# Patient Record
Sex: MALE | URBAN - METROPOLITAN AREA
[De-identification: names, ages, dates, MRNs, and addresses within clinical notes are randomized per-mention and may not be internally consistent; named-entity substitution may affect disease eponyms.]

---

## 2022-11-09 ENCOUNTER — CLAIMS CREATED FROM THE CLAIM WINDOW (OUTPATIENT)
Dept: URBAN - METROPOLITAN AREA MEDICAL CENTER 40 | Facility: MEDICAL CENTER | Age: 82
End: 2022-11-09
Payer: MEDICARE

## 2022-11-09 DIAGNOSIS — K92.1 MELENA: ICD-10-CM

## 2022-11-09 DIAGNOSIS — K57.30 DIVERTICULOSIS OF LARGE INTESTINE WITHOUT PERFORATION OR ABSCESS WITHOUT BLEEDING: ICD-10-CM

## 2022-11-09 DIAGNOSIS — D64.89 OTHER SPECIFIED ANEMIAS: ICD-10-CM

## 2022-11-09 PROCEDURE — 99222 1ST HOSP IP/OBS MODERATE 55: CPT | Performed by: INTERNAL MEDICINE

## 2022-11-10 ENCOUNTER — CLAIMS CREATED FROM THE CLAIM WINDOW (OUTPATIENT)
Dept: URBAN - METROPOLITAN AREA MEDICAL CENTER 40 | Facility: MEDICAL CENTER | Age: 82
End: 2022-11-10
Payer: MEDICARE

## 2022-11-10 DIAGNOSIS — D64.89 ANEMIA DUE TO OTHER CAUSE: ICD-10-CM

## 2022-11-10 DIAGNOSIS — K92.1 MELENA: ICD-10-CM

## 2022-11-10 PROCEDURE — 43235 EGD DIAGNOSTIC BRUSH WASH: CPT | Performed by: INTERNAL MEDICINE

## 2022-11-11 ENCOUNTER — TELEPHONE ENCOUNTER (OUTPATIENT)
Dept: URBAN - METROPOLITAN AREA CLINIC 72 | Facility: CLINIC | Age: 82
End: 2022-11-11

## 2022-11-11 RX ORDER — ALBUTEROL SULFATE 2.5 MG/3ML
INHALE ONE VIAL VIA NEBULIZER THREE TIMES A DAY SOLUTION RESPIRATORY (INHALATION)
Qty: 270 UNSPECIFIED | Refills: 1 | Status: ACTIVE | COMMUNITY

## 2022-11-11 RX ORDER — PREGABALIN 100 MG/1
1 CAPSULE CAPSULE ORAL ONCE A DAY
Status: ACTIVE | COMMUNITY

## 2022-11-11 RX ORDER — FINASTERIDE 5 MG/1
1 TABLET TABLET, FILM COATED ORAL ONCE A DAY
Status: ACTIVE | COMMUNITY

## 2022-11-11 RX ORDER — ROSUVASTATIN CALCIUM 10 MG/1
1 TABLET TABLET, FILM COATED ORAL ONCE A DAY
Status: ACTIVE | COMMUNITY

## 2022-11-11 RX ORDER — MONTELUKAST SODIUM 10 MG/1
TAKE 1 TABLET BY MOUTH ONCE DAILY AS DIRECTED TABLET, FILM COATED ORAL
Qty: 30 EACH | Refills: 6 | Status: ACTIVE | COMMUNITY

## 2022-11-11 RX ORDER — TAMSULOSIN HYDROCHLORIDE 0.4 MG/1
1 CAPSULE CAPSULE ORAL ONCE A DAY
Status: ACTIVE | COMMUNITY

## 2022-11-11 RX ORDER — OMEPRAZOLE 20 MG/1
1 CAPSULE 30 MINUTES BEFORE MORNING MEAL CAPSULE, DELAYED RELEASE ORAL ONCE A DAY
Status: ACTIVE | COMMUNITY

## 2022-11-11 RX ORDER — ZOLPIDEM TARTRATE 5 MG/1
TABLET, FILM COATED ORAL
Qty: 30 TABLET | Status: ACTIVE | COMMUNITY

## 2022-11-11 RX ORDER — TRAMADOL HYDROCHLORIDE 50 MG/1
TAKE 1 TO 2 TABLETS BY MOUTH EVERY 8 HOURS AS NEEDED FOR PAIN TABLET ORAL
Qty: 30 EACH | Refills: 0 | Status: ACTIVE | COMMUNITY

## 2022-11-11 RX ORDER — MELOXICAM 15 MG/1
1 TABLET TABLET ORAL ONCE A DAY
Status: ACTIVE | COMMUNITY

## 2022-11-11 RX ORDER — DOCUSATE SODIUM 100 MG/1
1 CAPSULE AS NEEDED CAPSULE ORAL ONCE A DAY
Status: ACTIVE | COMMUNITY

## 2022-11-11 RX ORDER — ASPIRIN 81 MG/1
1 TABLET TABLET, CHEWABLE ORAL ONCE A DAY
Status: ACTIVE | COMMUNITY

## 2022-11-14 ENCOUNTER — OFFICE VISIT (OUTPATIENT)
Dept: URBAN - METROPOLITAN AREA MEDICAL CENTER 40 | Facility: MEDICAL CENTER | Age: 82
End: 2022-11-14
Payer: MEDICARE

## 2022-11-14 DIAGNOSIS — K63.5 BENIGN COLON POLYP: ICD-10-CM

## 2022-11-14 DIAGNOSIS — K62.5 ANAL BLEEDING: ICD-10-CM

## 2022-11-14 DIAGNOSIS — K57.30 ACQUIRED DIVERTICULOSIS OF COLON: ICD-10-CM

## 2022-11-14 PROCEDURE — 45378 DIAGNOSTIC COLONOSCOPY: CPT | Performed by: INTERNAL MEDICINE

## 2022-11-17 ENCOUNTER — TELEPHONE ENCOUNTER (OUTPATIENT)
Dept: URBAN - METROPOLITAN AREA CLINIC 72 | Facility: CLINIC | Age: 82
End: 2022-11-17

## 2022-11-29 ENCOUNTER — CLAIMS CREATED FROM THE CLAIM WINDOW (OUTPATIENT)
Dept: URBAN - METROPOLITAN AREA CLINIC 72 | Facility: CLINIC | Age: 82
End: 2022-11-29
Payer: MEDICARE

## 2022-11-29 VITALS
HEART RATE: 62 BPM | BODY MASS INDEX: 28.93 KG/M2 | HEIGHT: 66 IN | SYSTOLIC BLOOD PRESSURE: 119 MMHG | WEIGHT: 180 LBS | DIASTOLIC BLOOD PRESSURE: 54 MMHG | TEMPERATURE: 98 F

## 2022-11-29 DIAGNOSIS — K57.30 ACQUIRED DIVERTICULOSIS OF COLON: ICD-10-CM

## 2022-11-29 DIAGNOSIS — D50.9 IRON DEFICIENCY ANEMIA, UNSPECIFIED IRON DEFICIENCY ANEMIA TYPE: ICD-10-CM

## 2022-11-29 DIAGNOSIS — K59.00 CONSTIPATION, UNSPECIFIED CONSTIPATION TYPE: ICD-10-CM

## 2022-11-29 PROBLEM — 398050005 DIVERTICULAR DISEASE OF COLON: Status: ACTIVE | Noted: 2022-11-21

## 2022-11-29 PROBLEM — 14760008: Status: ACTIVE | Noted: 2022-11-29

## 2022-11-29 PROCEDURE — 99214 OFFICE O/P EST MOD 30 MIN: CPT | Performed by: NURSE PRACTITIONER

## 2022-11-29 RX ORDER — ASPIRIN 81 MG/1
1 TABLET TABLET, CHEWABLE ORAL ONCE A DAY
Status: ON HOLD | COMMUNITY

## 2022-11-29 RX ORDER — MELOXICAM 15 MG/1
1 TABLET TABLET ORAL ONCE A DAY
Status: ACTIVE | COMMUNITY

## 2022-11-29 RX ORDER — OMEPRAZOLE 20 MG/1
1 CAPSULE 30 MINUTES BEFORE MORNING MEAL CAPSULE, DELAYED RELEASE ORAL ONCE A DAY
Status: ACTIVE | COMMUNITY

## 2022-11-29 RX ORDER — ZOLPIDEM TARTRATE 5 MG/1
TABLET, FILM COATED ORAL
Qty: 30 TABLET | Status: ACTIVE | COMMUNITY

## 2022-11-29 RX ORDER — ALBUTEROL SULFATE 2.5 MG/3ML
INHALE ONE VIAL VIA NEBULIZER THREE TIMES A DAY SOLUTION RESPIRATORY (INHALATION)
Qty: 270 UNSPECIFIED | Refills: 1 | Status: ACTIVE | COMMUNITY

## 2022-11-29 RX ORDER — FINASTERIDE 5 MG/1
1 TABLET TABLET, FILM COATED ORAL ONCE A DAY
Status: ACTIVE | COMMUNITY

## 2022-11-29 RX ORDER — PREGABALIN 100 MG/1
1 CAPSULE CAPSULE ORAL ONCE A DAY
Status: ACTIVE | COMMUNITY

## 2022-11-29 RX ORDER — TRAMADOL HYDROCHLORIDE 50 MG/1
TAKE 1 TO 2 TABLETS BY MOUTH EVERY 8 HOURS AS NEEDED FOR PAIN TABLET ORAL
Qty: 30 EACH | Refills: 0 | Status: ACTIVE | COMMUNITY

## 2022-11-29 RX ORDER — TAMSULOSIN HYDROCHLORIDE 0.4 MG/1
1 CAPSULE CAPSULE ORAL ONCE A DAY
Status: ACTIVE | COMMUNITY

## 2022-11-29 RX ORDER — DOCUSATE SODIUM 100 MG/1
1 CAPSULE AS NEEDED CAPSULE ORAL ONCE A DAY
Status: ACTIVE | COMMUNITY

## 2022-11-29 RX ORDER — MONTELUKAST SODIUM 10 MG/1
TAKE 1 TABLET BY MOUTH ONCE DAILY AS DIRECTED TABLET, FILM COATED ORAL
Qty: 30 EACH | Refills: 6 | Status: ACTIVE | COMMUNITY

## 2022-11-29 RX ORDER — ROSUVASTATIN CALCIUM 10 MG/1
1 TABLET TABLET, FILM COATED ORAL ONCE A DAY
Status: ACTIVE | COMMUNITY

## 2022-11-29 NOTE — HPI-TODAY'S VISIT:
82-year-old male here for colonoscopy follow-up.  Was seen inpatient 11/9/2022 for blood in stool.  There is mention of dark-colored stool as well.  Work-up in ER confirmed anemia and CT showed diverticulosis.  There is no further bleeding since arrival in the hospital.  EGD 11/10/2022.  Normal esophagus, stomach and duodenum. Colonoscopy 11/14/2022 revealed moderate diverticulosis throughout the colon.  No bleeding or signs of recent bleeding no evidence of heme product.  3 mm sessile transverse colon polyp not removed due to risk of bleeding and diagnostic nature of colonoscopy.  Consider repeat colonoscopy in the future for polypectomy.  CT abdomen and pelvis with contrast 11/9/2022.  Pancolonic diverticulosis severe in descending and sigmoid colon.  5 mm nodule and right middle lobe lung base and 5 mm nodule left lateral lower lobe lung.  Labs 11/9/2022.  CBC:Hemoglobin 9.6, Kenny crit 29.2, RBC 3.71, MCV 79, RDW 18.  INR 1.2.  Lipase normal at 36.  CMP: Sodium 135, calcium 8.5, BUN 27. Labs 11/10/2022.  Hemoglobin 8.1, iron low at 22, ferritin 13.3. Labs 11/11/2022.  CBC:Hemoglobin 8, hematocrit 25.2, RBC 3.15, MCV 88.  On interview today he is here with his wife and is in a wheelchair due to his COPD an is not able to walk far.  He is not on oxygen.  This is not new.  He reports he feels weak from his hospital visit and is cold.  No melena or hematochezia.  He is on omeprazole and has been on it for years which controls his GERD. He takes meloxicam for pain.  Occasional constipatoin, on 4 stool softeners daily and miralax.  Going to his PCP tomorrow.

## 2022-12-01 ENCOUNTER — TELEPHONE ENCOUNTER (OUTPATIENT)
Dept: URBAN - METROPOLITAN AREA CLINIC 72 | Facility: CLINIC | Age: 82
End: 2022-12-01

## 2022-12-01 PROBLEM — 87522002: Status: ACTIVE | Noted: 2022-11-29

## 2022-12-01 LAB
ABSOLUTE BASOPHILS: 30
ABSOLUTE EOSINOPHILS: 70
ABSOLUTE LYMPHOCYTES: 1395
ABSOLUTE MONOCYTES: 625
ABSOLUTE NEUTROPHILS: 2880
BASOPHILS: 0.6
EOSINOPHILS: 1.4
HEMATOCRIT: 23.4
HEMOGLOBIN: 7.2
LYMPHOCYTES: 27.9
MCH: 24.2
MCHC: 30.8
MCV: 78.8
MONOCYTES: 12.5
MPV: 9.6
NEUTROPHILS: 57.6
PLATELET COUNT: 277
RDW: 16
RED BLOOD CELL COUNT: 2.97
WHITE BLOOD CELL COUNT: 5

## 2022-12-02 ENCOUNTER — OFFICE VISIT (OUTPATIENT)
Dept: URBAN - METROPOLITAN AREA CLINIC 72 | Facility: CLINIC | Age: 82
End: 2022-12-02

## 2023-01-04 ENCOUNTER — ESTABLISHED PATIENT (OUTPATIENT)
Dept: URBAN - METROPOLITAN AREA CLINIC 20 | Facility: CLINIC | Age: 83
End: 2023-01-04

## 2023-01-04 DIAGNOSIS — H34.11: ICD-10-CM

## 2023-01-04 DIAGNOSIS — H43.812: ICD-10-CM

## 2023-01-04 PROCEDURE — 92014 COMPRE OPH EXAM EST PT 1/>: CPT

## 2023-01-04 ASSESSMENT — TONOMETRY
OD_IOP_MMHG: 10
OS_IOP_MMHG: 10

## 2023-01-04 ASSESSMENT — VISUAL ACUITY
OS_SC: 20/25+2
OS_SC: J7
OU_SC: 20/25

## 2023-01-04 ASSESSMENT — KERATOMETRY
OS_AXISANGLE2_DEGREES: 19
OS_K1POWER_DIOPTERS: 43.50
OS_AXISANGLE_DEGREES: 109
OS_K2POWER_DIOPTERS: 44.00

## 2023-02-21 ENCOUNTER — OFFICE VISIT (OUTPATIENT)
Dept: URBAN - METROPOLITAN AREA CLINIC 72 | Facility: CLINIC | Age: 83
End: 2023-02-21

## 2024-01-04 ENCOUNTER — OFFICE VISIT (OUTPATIENT)
Dept: URBAN - METROPOLITAN AREA CLINIC 72 | Facility: CLINIC | Age: 84
End: 2024-01-04

## 2024-01-04 RX ORDER — OMEPRAZOLE 20 MG/1
1 CAPSULE 30 MINUTES BEFORE MORNING MEAL CAPSULE, DELAYED RELEASE ORAL ONCE A DAY
Status: ACTIVE | COMMUNITY

## 2024-01-04 RX ORDER — PREGABALIN 100 MG/1
1 CAPSULE CAPSULE ORAL ONCE A DAY
Status: ACTIVE | COMMUNITY

## 2024-01-04 RX ORDER — MONTELUKAST SODIUM 10 MG/1
TAKE 1 TABLET BY MOUTH ONCE DAILY AS DIRECTED TABLET, FILM COATED ORAL
Qty: 30 EACH | Refills: 6 | Status: ACTIVE | COMMUNITY

## 2024-01-04 RX ORDER — ROSUVASTATIN CALCIUM 10 MG/1
1 TABLET TABLET, FILM COATED ORAL ONCE A DAY
Status: ACTIVE | COMMUNITY

## 2024-01-04 RX ORDER — FINASTERIDE 5 MG/1
1 TABLET TABLET, FILM COATED ORAL ONCE A DAY
Status: ACTIVE | COMMUNITY

## 2024-01-04 RX ORDER — ALBUTEROL SULFATE 2.5 MG/3ML
INHALE ONE VIAL VIA NEBULIZER THREE TIMES A DAY SOLUTION RESPIRATORY (INHALATION)
Qty: 270 UNSPECIFIED | Refills: 1 | Status: ACTIVE | COMMUNITY

## 2024-01-04 RX ORDER — TAMSULOSIN HYDROCHLORIDE 0.4 MG/1
1 CAPSULE CAPSULE ORAL ONCE A DAY
Status: ACTIVE | COMMUNITY

## 2024-01-04 RX ORDER — DOCUSATE SODIUM 100 MG/1
1 CAPSULE AS NEEDED CAPSULE ORAL ONCE A DAY
Status: ACTIVE | COMMUNITY

## 2024-01-04 RX ORDER — ZOLPIDEM TARTRATE 5 MG/1
TABLET, FILM COATED ORAL
Qty: 30 TABLET | Status: ACTIVE | COMMUNITY

## 2024-01-04 RX ORDER — ASPIRIN 81 MG/1
1 TABLET TABLET, CHEWABLE ORAL ONCE A DAY
Status: ON HOLD | COMMUNITY

## 2024-01-04 RX ORDER — TRAMADOL HYDROCHLORIDE 50 MG/1
TAKE 1 TO 2 TABLETS BY MOUTH EVERY 8 HOURS AS NEEDED FOR PAIN TABLET ORAL
Qty: 30 EACH | Refills: 0 | Status: ACTIVE | COMMUNITY

## 2024-01-04 RX ORDER — MELOXICAM 15 MG/1
1 TABLET TABLET ORAL ONCE A DAY
Status: ACTIVE | COMMUNITY

## 2024-01-04 NOTE — HPI-TODAY'S VISIT:
83-year-old male here for constipation.    Last seen 11/29/2022 for colonoscopy follow-up, RENAE, diverticulosis and constipation.  CBC ordered advised to continue PPI, stool softeners and MiraLAX.  Hemoglobin dropped to 7.2 was referred stat to Dr. Mohan with an iron saturation of 3% requiring packed red blood cell transfusion and iron repletion therapy on Monoferric.  Not able to tolerate oral ferrous sulfate due to constipation.  Follows ENT as well.  Last note:  On interview today he is here with his wife and is in a wheelchair due to his COPD an is not able to walk far.  He is not on oxygen.  This is not new.  He reports he feels weak from his hospital visit and is cold.  No melena or hematochezia.  He is on omeprazole and has been on it for years which controls his GERD. He takes meloxicam for pain.  Occasional constipatoin, on 4 stool softeners daily and miralax.  Going to his PCP tomorrow.

## 2024-01-04 NOTE — HPI-OTHER HISTORIES
Labs: -11/10/2022. Hemoglobin 8.1, iron low at 22, ferritin 13.3. -5/5/2023.  CMP: Sodium 132, creatinine 0.72, chloride 95, iron 37, iron saturation 14%. -6/5/2023.  Iron low at 36 with iron saturation 16%.-8/11/2023.  CMP: Sodium 134, chloride 97.  CBC: Hgb 12.8, RBC 4.03, HCT 37.8 with normal platelet and MCV 93.8.  Thyroid studies normal.  Hemoglobin A1c normal at 5.3. -10/27/2023.  Iron normal at 54, iron saturation 22%. . Procedures: -EGD 11/10/2022. Normal esophagus, stomach and duodenum. -Colonoscopy 11/14/2022 revealed moderate diverticulosis throughout the colon. No bleeding or signs of recent bleeding no evidence of heme product. 3 mm sessile transverse colon polyp not removed due to risk of bleeding and diagnostic nature of colonoscopy. Consider repeat colonoscopy in the future for polypectomy. . Imaging: -CT abdomen and pelvis with contrast 11/9/2022. Pancolonic diverticulosis severe in descending and sigmoid colon. 5 mm nodule and right middle lobe lung base and 5 mm nodule left lateral lower lobe lung.

## 2024-01-25 ENCOUNTER — OFFICE VISIT (OUTPATIENT)
Dept: URBAN - METROPOLITAN AREA CLINIC 72 | Facility: CLINIC | Age: 84
End: 2024-01-25
Payer: MEDICARE

## 2024-01-25 ENCOUNTER — DASHBOARD ENCOUNTERS (OUTPATIENT)
Age: 84
End: 2024-01-25

## 2024-01-25 VITALS
SYSTOLIC BLOOD PRESSURE: 117 MMHG | BODY MASS INDEX: 28.93 KG/M2 | WEIGHT: 180 LBS | HEART RATE: 129 BPM | HEIGHT: 66 IN | DIASTOLIC BLOOD PRESSURE: 93 MMHG | TEMPERATURE: 97.3 F

## 2024-01-25 DIAGNOSIS — K57.30 ACQUIRED DIVERTICULOSIS OF COLON: ICD-10-CM

## 2024-01-25 DIAGNOSIS — K59.09 CHRONIC CONSTIPATION: ICD-10-CM

## 2024-01-25 DIAGNOSIS — K21.9 GASTROESOPHAGEAL REFLUX DISEASE WITHOUT ESOPHAGITIS: ICD-10-CM

## 2024-01-25 DIAGNOSIS — R14.3 PASSING GAS: ICD-10-CM

## 2024-01-25 PROBLEM — 266435005: Status: ACTIVE | Noted: 2024-01-25

## 2024-01-25 PROCEDURE — 99214 OFFICE O/P EST MOD 30 MIN: CPT | Performed by: NURSE PRACTITIONER

## 2024-01-25 RX ORDER — FINASTERIDE 5 MG/1
1 TABLET TABLET, FILM COATED ORAL ONCE A DAY
Status: ACTIVE | COMMUNITY

## 2024-01-25 RX ORDER — ASPIRIN 81 MG/1
1 TABLET TABLET, CHEWABLE ORAL ONCE A DAY
Status: ON HOLD | COMMUNITY

## 2024-01-25 RX ORDER — TAMSULOSIN HYDROCHLORIDE 0.4 MG/1
1 CAPSULE CAPSULE ORAL ONCE A DAY
Status: ACTIVE | COMMUNITY

## 2024-01-25 RX ORDER — OMEPRAZOLE 20 MG/1
1 CAPSULE 30 MINUTES BEFORE MORNING MEAL CAPSULE, DELAYED RELEASE ORAL ONCE A DAY
Status: ACTIVE | COMMUNITY

## 2024-01-25 RX ORDER — ASPIRIN AND EXTENDED - RELEASE DIPYRIDAMOLE 25; 200 MG/1; MG/1
1 CAPSULE CAPSULE ORAL TWICE A DAY
Status: ACTIVE | COMMUNITY

## 2024-01-25 RX ORDER — ROSUVASTATIN CALCIUM 10 MG/1
1 TABLET TABLET, FILM COATED ORAL ONCE A DAY
Status: ACTIVE | COMMUNITY

## 2024-01-25 RX ORDER — MONTELUKAST SODIUM 10 MG/1
TAKE 1 TABLET BY MOUTH ONCE DAILY AS DIRECTED TABLET, FILM COATED ORAL
Qty: 30 EACH | Refills: 6 | Status: ACTIVE | COMMUNITY

## 2024-01-25 RX ORDER — ZOLPIDEM TARTRATE 5 MG/1
TABLET, FILM COATED ORAL
Qty: 30 TABLET | Status: ACTIVE | COMMUNITY

## 2024-01-25 RX ORDER — MELOXICAM 15 MG/1
1 TABLET TABLET ORAL ONCE A DAY
Status: ACTIVE | COMMUNITY

## 2024-01-25 RX ORDER — TRAMADOL HYDROCHLORIDE 50 MG/1
TAKE 1 TO 2 TABLETS BY MOUTH EVERY 8 HOURS AS NEEDED FOR PAIN TABLET ORAL
Qty: 30 EACH | Refills: 0 | Status: ACTIVE | COMMUNITY

## 2024-01-25 RX ORDER — DOCUSATE SODIUM 100 MG/1
1 CAPSULE AS NEEDED CAPSULE ORAL ONCE A DAY
Status: ACTIVE | COMMUNITY

## 2024-01-25 RX ORDER — ALBUTEROL SULFATE 2.5 MG/3ML
INHALE ONE VIAL VIA NEBULIZER THREE TIMES A DAY SOLUTION RESPIRATORY (INHALATION)
Qty: 270 UNSPECIFIED | Refills: 1 | Status: ACTIVE | COMMUNITY

## 2024-01-25 RX ORDER — PREGABALIN 100 MG/1
1 CAPSULE CAPSULE ORAL ONCE A DAY
Status: ACTIVE | COMMUNITY

## 2024-01-25 NOTE — HPI-TODAY'S VISIT:
83-year-old male here for constipation.    Last seen 11/29/2022 for colonoscopy follow-up, RENAE, diverticulosis and constipation.  CBC ordered advised to continue PPI, stool softeners and MiraLAX.  Hemoglobin dropped to 7.2 was referred stat to Dr. Mohan with an iron saturation of 3% requiring packed red blood cell transfusion and iron repletion therapy on Monoferric.  Not able to tolerate oral ferrous sulfate due to constipation.  Follows ENT as well.  On interview today he is here with his wife and is in a wheelchair due to his COPD an is not able to walk far.  He is not on oxygen.  This is not new. Saw Dr. Francis in December and was told everything was good and to follow-up in 6 months. He was constipated but has been drinking more water helped with his constipation, During the day he would get diarrhea, but he thought it was due to being cold. He put more clothes on and that helped as well.  On stool softeners and miralax daily.  No melena or hematochezia. He is not interested in another colonoscopy.  He does have gas at night. He is on omeprazole and has been on it for years which controls his GERD.

## 2024-01-25 NOTE — HPI-OTHER HISTORIES
Labs: -11/10/2022. Hemoglobin 8.1, iron low at 22, ferritin 13.3. -5/5/2023.  CMP: Sodium 132, creatinine 0.72, chloride 95, iron 37, iron saturation 14%. -6/5/2023.  Iron low at 36 with iron saturation 16%. -8/11/2023.  CMP: Sodium 134, chloride 97.  CBC: Hgb 12.8, RBC 4.03, HCT 37.8 with normal platelet and MCV 93.8.  Thyroid studies normal.  Hemoglobin A1c normal at 5.3. -10/27/2023.  Iron normal at 54, iron saturation 22%. . Procedures: -EGD 11/10/2022. Normal esophagus, stomach and duodenum. -Colonoscopy 11/14/2022 revealed moderate diverticulosis throughout the colon. No bleeding or signs of recent bleeding no evidence of heme product. 3 mm sessile transverse colon polyp not removed due to risk of bleeding and diagnostic nature of colonoscopy. Consider repeat colonoscopy in the future for polypectomy. . Imaging: -CT abdomen and pelvis with contrast 11/9/2022. Pancolonic diverticulosis severe in descending and sigmoid colon. 5 mm nodule and right middle lobe lung base and 5 mm nodule left lateral lower lobe lung.